# Patient Record
Sex: MALE | Race: OTHER | NOT HISPANIC OR LATINO | URBAN - METROPOLITAN AREA
[De-identification: names, ages, dates, MRNs, and addresses within clinical notes are randomized per-mention and may not be internally consistent; named-entity substitution may affect disease eponyms.]

---

## 2017-12-14 ENCOUNTER — EMERGENCY (EMERGENCY)
Facility: HOSPITAL | Age: 77
LOS: 1 days | Discharge: HOME | End: 2017-12-14
Admitting: EMERGENCY MEDICINE
Payer: MEDICARE

## 2017-12-14 VITALS
HEIGHT: 69 IN | SYSTOLIC BLOOD PRESSURE: 184 MMHG | HEART RATE: 93 BPM | TEMPERATURE: 98 F | DIASTOLIC BLOOD PRESSURE: 98 MMHG | OXYGEN SATURATION: 98 % | RESPIRATION RATE: 18 BRPM | WEIGHT: 164.91 LBS

## 2017-12-14 DIAGNOSIS — M79.89 OTHER SPECIFIED SOFT TISSUE DISORDERS: ICD-10-CM

## 2017-12-14 DIAGNOSIS — I82.401 ACUTE EMBOLISM AND THROMBOSIS OF UNSPECIFIED DEEP VEINS OF RIGHT LOWER EXTREMITY: ICD-10-CM

## 2017-12-14 DIAGNOSIS — I10 ESSENTIAL (PRIMARY) HYPERTENSION: ICD-10-CM

## 2017-12-14 DIAGNOSIS — I82.402 ACUTE EMBOLISM AND THROMBOSIS OF UNSPECIFIED DEEP VEINS OF LEFT LOWER EXTREMITY: ICD-10-CM

## 2017-12-14 DIAGNOSIS — Z98.890 OTHER SPECIFIED POSTPROCEDURAL STATES: Chronic | ICD-10-CM

## 2017-12-14 DIAGNOSIS — Z87.891 PERSONAL HISTORY OF NICOTINE DEPENDENCE: ICD-10-CM

## 2017-12-14 DIAGNOSIS — J45.20 MILD INTERMITTENT ASTHMA, UNCOMPLICATED: ICD-10-CM

## 2017-12-14 LAB
ALBUMIN SERPL ELPH-MCNC: 3.8 G/DL — SIGNIFICANT CHANGE UP (ref 3.3–5)
ALP SERPL-CCNC: 55 U/L — SIGNIFICANT CHANGE UP (ref 40–120)
ALT FLD-CCNC: 15 U/L — SIGNIFICANT CHANGE UP (ref 10–45)
ANION GAP SERPL CALC-SCNC: 10 MMOL/L — SIGNIFICANT CHANGE UP (ref 5–17)
APTT BLD: 33.8 SEC — SIGNIFICANT CHANGE UP (ref 27.5–37.4)
AST SERPL-CCNC: 23 U/L — SIGNIFICANT CHANGE UP (ref 10–40)
BASOPHILS NFR BLD AUTO: 0.2 % — SIGNIFICANT CHANGE UP (ref 0–2)
BILIRUB SERPL-MCNC: 0.5 MG/DL — SIGNIFICANT CHANGE UP (ref 0.2–1.2)
BUN SERPL-MCNC: 21 MG/DL — SIGNIFICANT CHANGE UP (ref 7–23)
CALCIUM SERPL-MCNC: 9.3 MG/DL — SIGNIFICANT CHANGE UP (ref 8.4–10.5)
CHLORIDE SERPL-SCNC: 103 MMOL/L — SIGNIFICANT CHANGE UP (ref 96–108)
CO2 SERPL-SCNC: 25 MMOL/L — SIGNIFICANT CHANGE UP (ref 22–31)
CREAT SERPL-MCNC: 1 MG/DL — SIGNIFICANT CHANGE UP (ref 0.5–1.3)
EOSINOPHIL NFR BLD AUTO: 2 % — SIGNIFICANT CHANGE UP (ref 0–6)
GLUCOSE SERPL-MCNC: 104 MG/DL — HIGH (ref 70–99)
HCT VFR BLD CALC: 40.1 % — SIGNIFICANT CHANGE UP (ref 39–50)
HGB BLD-MCNC: 14.1 G/DL — SIGNIFICANT CHANGE UP (ref 13–17)
INR BLD: 1.04 — SIGNIFICANT CHANGE UP (ref 0.88–1.16)
LYMPHOCYTES # BLD AUTO: 22.9 % — SIGNIFICANT CHANGE UP (ref 13–44)
MCHC RBC-ENTMCNC: 29.9 PG — SIGNIFICANT CHANGE UP (ref 27–34)
MCHC RBC-ENTMCNC: 35.2 G/DL — SIGNIFICANT CHANGE UP (ref 32–36)
MCV RBC AUTO: 85 FL — SIGNIFICANT CHANGE UP (ref 80–100)
MONOCYTES NFR BLD AUTO: 9.5 % — SIGNIFICANT CHANGE UP (ref 2–14)
NEUTROPHILS NFR BLD AUTO: 65.4 % — SIGNIFICANT CHANGE UP (ref 43–77)
PLATELET # BLD AUTO: 127 K/UL — LOW (ref 150–400)
POTASSIUM SERPL-MCNC: 4.6 MMOL/L — SIGNIFICANT CHANGE UP (ref 3.5–5.3)
POTASSIUM SERPL-SCNC: 4.6 MMOL/L — SIGNIFICANT CHANGE UP (ref 3.5–5.3)
PROT SERPL-MCNC: 6.5 G/DL — SIGNIFICANT CHANGE UP (ref 6–8.3)
PROTHROM AB SERPL-ACNC: 11.6 SEC — SIGNIFICANT CHANGE UP (ref 9.8–12.7)
RBC # BLD: 4.72 M/UL — SIGNIFICANT CHANGE UP (ref 4.2–5.8)
RBC # FLD: 12.5 % — SIGNIFICANT CHANGE UP (ref 10.3–16.9)
SODIUM SERPL-SCNC: 138 MMOL/L — SIGNIFICANT CHANGE UP (ref 135–145)
WBC # BLD: 6 K/UL — SIGNIFICANT CHANGE UP (ref 3.8–10.5)
WBC # FLD AUTO: 6 K/UL — SIGNIFICANT CHANGE UP (ref 3.8–10.5)

## 2017-12-14 PROCEDURE — 99284 EMERGENCY DEPT VISIT MOD MDM: CPT

## 2017-12-14 PROCEDURE — 85610 PROTHROMBIN TIME: CPT

## 2017-12-14 PROCEDURE — 36415 COLL VENOUS BLD VENIPUNCTURE: CPT

## 2017-12-14 PROCEDURE — 85025 COMPLETE CBC W/AUTO DIFF WBC: CPT

## 2017-12-14 PROCEDURE — 85730 THROMBOPLASTIN TIME PARTIAL: CPT

## 2017-12-14 PROCEDURE — 99283 EMERGENCY DEPT VISIT LOW MDM: CPT

## 2017-12-14 PROCEDURE — 80053 COMPREHEN METABOLIC PANEL: CPT

## 2017-12-14 RX ORDER — FONDAPARINUX SODIUM 2.5 MG/.5ML
1 INJECTION, SOLUTION SUBCUTANEOUS
Qty: 42 | Refills: 0 | OUTPATIENT
Start: 2017-12-14 | End: 2018-01-03

## 2017-12-14 NOTE — ED PROVIDER NOTE - MEDICAL DECISION MAKING DETAILS
Reported DVT on outside study.  Will try to obtain results to determine location and clot burden.  No s/s to suggest PE.  No hx of coagulopathy or falls that would be contraindication to AC.

## 2017-12-14 NOTE — ED PROVIDER NOTE - PHYSICAL EXAMINATION
CONSTITUTIONAL: WD,WN. NAD.    SKIN: Normal color and turgor. No rash.    HEAD: NC/AT.  EYES: Conjunctiva clear. EOMI. PERRL.    ENT: Airway patent, OP without erythema, tonsillar swelling or exudate; uvula midline without swelling. Nasal mucosa clear, no rhinorrhea.   RESPIRATORY:  Breathing non-labored. No retractions or accessory muscle use.  Lungs CTA bilat.  CARDIOVASCULAR:  RRR, S1S2. No M/R/G.      GI:  Abdomen soft, nontender.    MSK: Neck supple with painless ROM.  Bilateral LE edema below knees, left > right.  No calf tenderness or Mera sign.  Strong DP pulses.   NEURO: Alert and oriented; 5/5 strength in all extremities. Normal speech and gait.

## 2017-12-14 NOTE — ED ADULT NURSE NOTE - CHIEF COMPLAINT QUOTE
Pt directed to ED with confirmed DVT and CO LLE swelling.  Pt states "I came back from a trip on 11/13 so I had an US today and they saw a clot."  PT denies recent falls, trauma, Dizziness, N/V/D, SOB, Fevers, CP

## 2017-12-14 NOTE — ED ADULT NURSE NOTE - OBJECTIVE STATEMENT
76 y/o male c/o left leg swelling and pain. Pt states " I was sent here by my PCP because I have a blood clot in my leg, they did an US today and told me to come" . Pt denies any fall or recent injuries, but report traveling on 11/13.

## 2017-12-14 NOTE — ED ADULT TRIAGE NOTE - CHIEF COMPLAINT QUOTE
Pt CO LLE swelling.  Pt states "I came back from a trip on 11/13 and I'm concerned I have a blood clot."  PT denies recent falls, trauma, Dizziness, N/V/D, SOB, Fevers, CP Pt directed to ED with confirmed DVT and CO LLE swelling.  Pt states "I came back from a trip on 11/13 so I had an US today and they saw a clot."  PT denies recent falls, trauma, Dizziness, N/V/D, SOB, Fevers, CP

## 2017-12-14 NOTE — ED PROVIDER NOTE - OBJECTIVE STATEMENT
pt with Hx of HTN on Benicar, hx mild intermittent asthma - saw podiatrist today for his Left foot plantar fasciitis; left leg swelling noted and pt was sent for US which reportedly showed a DVT.  pt was then sent to ER for further evaluation and treatment.  He recently flew to [place] about a month ago, seated on plane apx 9 hrs.    denies any cp/palpitations/sob/hook/hemoptysis.  no prior hx of dvt/pe.  no gait or balance problems, no hx of falls.  no bleeding disorders, or unusual bruising or bleeding.

## 2017-12-14 NOTE — ED PROVIDER NOTE - PROGRESS NOTE DETAILS
called  Radiology, they will fax US report to us. called pt's PCP Dr Rodrick Snyder, got answering service.  Left message, await call back. US report: + DVT below knee in posterior tibial v. likely subacute vs chronic.  no DVT above knee.  4.1 cm popliteal cyst. 2nd call to Dr Snyder's answering svc. still no call back from PCP.  pt intelligent and reliable.  will start xarelto and have him contact PCP in morning. POST-DISCHARGE NOTE:  Patient's PCP Dr Manson called back, case discussed.  He will follow up with patient tomorrow.

## 2020-04-03 NOTE — ED PROVIDER NOTE - TOBACCO USE
Former smoker
Implemented All Fall with Harm Risk Interventions:  [place] to call system. Call bell, personal items and telephone within reach. Instruct patient to call for assistance. Room bathroom lighting operational. Non-slip footwear when patient is off stretcher. Physically safe environment: no spills, clutter or unnecessary equipment. Stretcher in lowest position, wheels locked, appropriate side rails in place. Provide visual cue, wrist band, yellow gown, etc. Monitor gait and stability. Monitor for mental status changes and reorient to person, place, and time. Review medications for side effects contributing to fall risk. Reinforce activity limits and safety measures with patient and family. Provide visual clues: red socks.

## 2021-04-09 NOTE — ED ADULT NURSE NOTE - PSH
Detail Level: Zone Topical Clindamycin Pregnancy And Lactation Text: This medication is Pregnancy Category B and is considered safe during pregnancy. It is unknown if it is excreted in breast milk. Topical Retinoid counseling:  Patient advised to apply a pea-sized amount only at bedtime and wait 30 minutes after washing their face before applying.  If too drying, patient may add a non-comedogenic moisturizer. The patient verbalized understanding of the proper use and possible adverse effects of retinoids.  All of the patient's questions and concerns were addressed. Doxycycline Counseling:  Patient counseled regarding possible photosensitivity and increased risk for sunburn.  Patient instructed to avoid sunlight, if possible.  When exposed to sunlight, patients should wear protective clothing, sunglasses, and sunscreen.  The patient was instructed to call the office immediately if the following severe adverse effects occur:  hearing changes, easy bruising/bleeding, severe headache, or vision changes.  The patient verbalized understanding of the proper use and possible adverse effects of doxycycline.  All of the patient's questions and concerns were addressed. Minocycline Pregnancy And Lactation Text: This medication is Pregnancy Category D and not consider safe during pregnancy. It is also excreted in breast milk. Isotretinoin Counseling: Patient should get monthly blood tests, not donate blood, not drive at night if vision affected, not share medication, and not undergo elective surgery for 6 months after tx completed. Side effects reviewed, pt to contact office should one occur. Spironolactone Pregnancy And Lactation Text: This medication can cause feminization of the male fetus and should be avoided during pregnancy. The active metabolite is also found in breast milk. Bactrim Pregnancy And Lactation Text: This medication is Pregnancy Category D and is known to cause fetal risk.  It is also excreted in breast milk. Topical Clindamycin Counseling: Patient counseled that this medication may cause skin irritation or allergic reactions.  In the event of skin irritation, the patient was advised to reduce the amount of the drug applied or use it less frequently.   The patient verbalized understanding of the proper use and possible adverse effects of clindamycin.  All of the patient's questions and concerns were addressed. Benzoyl Peroxide Pregnancy And Lactation Text: This medication is Pregnancy Category C. It is unknown if benzoyl peroxide is excreted in breast milk. Dapsone Pregnancy And Lactation Text: This medication is Pregnancy Category C and is not considered safe during pregnancy or breast feeding. Include Pregnancy/Lactation Warning?: No Minocycline Counseling: Patient advised regarding possible photosensitivity and discoloration of the teeth, skin, lips, tongue and gums.  Patient instructed to avoid sunlight, if possible.  When exposed to sunlight, patients should wear protective clothing, sunglasses, and sunscreen.  The patient was instructed to call the office immediately if the following severe adverse effects occur:  hearing changes, easy bruising/bleeding, severe headache, or vision changes.  The patient verbalized understanding of the proper use and possible adverse effects of minocycline.  All of the patient's questions and concerns were addressed. Erythromycin Pregnancy And Lactation Text: This medication is Pregnancy Category B and is considered safe during pregnancy. It is also excreted in breast milk. Spironolactone Counseling: Patient advised regarding risks of diarrhea, abdominal pain, hyperkalemia, birth defects (for female patients), liver toxicity and renal toxicity. The patient may need blood work to monitor liver and kidney function and potassium levels while on therapy. The patient verbalized understanding of the proper use and possible adverse effects of spironolactone.  All of the patient's questions and concerns were addressed. Bactrim Counseling:  I discussed with the patient the risks of sulfa antibiotics including but not limited to GI upset, allergic reaction, drug rash, diarrhea, dizziness, photosensitivity, and yeast infections.  Rarely, more serious reactions can occur including but not limited to aplastic anemia, agranulocytosis, methemoglobinemia, blood dyscrasias, liver or kidney failure, lung infiltrates or desquamative/blistering drug rashes. Tazorac Pregnancy And Lactation Text: This medication is not safe during pregnancy. It is unknown if this medication is excreted in breast milk. Dapsone Counseling: I discussed with the patient the risks of dapsone including but not limited to hemolytic anemia, agranulocytosis, rashes, methemoglobinemia, kidney failure, peripheral neuropathy, headaches, GI upset, and liver toxicity.  Patients who start dapsone require monitoring including baseline LFTs and weekly CBCs for the first month, then every month thereafter.  The patient verbalized understanding of the proper use and possible adverse effects of dapsone.  All of the patient's questions and concerns were addressed. Topical Sulfur Applications Pregnancy And Lactation Text: This medication is Pregnancy Category C and has an unknown safety profile during pregnancy. It is unknown if this topical medication is excreted in breast milk. High Dose Vitamin A Pregnancy And Lactation Text: High dose vitamin A therapy is contraindicated during pregnancy and breast feeding. Benzoyl Peroxide Counseling: Patient counseled that medicine may cause skin irritation and bleach clothing.  In the event of skin irritation, the patient was advised to reduce the amount of the drug applied or use it less frequently.   The patient verbalized understanding of the proper use and possible adverse effects of benzoyl peroxide.  All of the patient's questions and concerns were addressed. Azithromycin Pregnancy And Lactation Text: This medication is considered safe during pregnancy and is also secreted in breast milk. Tazorac Counseling:  Patient advised that medication is irritating and drying.  Patient may need to apply sparingly and wash off after an hour before eventually leaving it on overnight.  The patient verbalized understanding of the proper use and possible adverse effects of tazorac.  All of the patient's questions and concerns were addressed. Erythromycin Counseling:  I discussed with the patient the risks of erythromycin including but not limited to GI upset, allergic reaction, drug rash, diarrhea, increase in liver enzymes, and yeast infections. Birth Control Pills Pregnancy And Lactation Text: This medication should be avoided if pregnant and for the first 30 days post-partum. Topical Sulfur Applications Counseling: Topical Sulfur Counseling: Patient counseled that this medication may cause skin irritation or allergic reactions.  In the event of skin irritation, the patient was advised to reduce the amount of the drug applied or use it less frequently.   The patient verbalized understanding of the proper use and possible adverse effects of topical sulfur application.  All of the patient's questions and concerns were addressed. High Dose Vitamin A Counseling: Side effects reviewed, pt to contact office should one occur. Sarecycline Counseling: Patient advised regarding possible photosensitivity and discoloration of the teeth, skin, lips, tongue and gums.  Patient instructed to avoid sunlight, if possible.  When exposed to sunlight, patients should wear protective clothing, sunglasses, and sunscreen.  The patient was instructed to call the office immediately if the following severe adverse effects occur:  hearing changes, easy bruising/bleeding, severe headache, or vision changes.  The patient verbalized understanding of the proper use and possible adverse effects of sarecycline.  All of the patient's questions and concerns were addressed. Azithromycin Counseling:  I discussed with the patient the risks of azithromycin including but not limited to GI upset, allergic reaction, drug rash, diarrhea, and yeast infections. Topical Retinoid Pregnancy And Lactation Text: This medication is Pregnancy Category C. It is unknown if this medication is excreted in breast milk. Doxycycline Pregnancy And Lactation Text: This medication is Pregnancy Category D and not consider safe during pregnancy. It is also excreted in breast milk but is considered safe for shorter treatment courses. Birth Control Pills Counseling: Birth Control Pill Counseling: I discussed with the patient the potential side effects of OCPs including but not limited to increased risk of stroke, heart attack, thrombophlebitis, deep venous thrombosis, hepatic adenomas, breast changes, GI upset, headaches, and depression.  The patient verbalized understanding of the proper use and possible adverse effects of OCPs. All of the patient's questions and concerns were addressed. Isotretinoin Pregnancy And Lactation Text: This medication is Pregnancy Category X and is considered extremely dangerous during pregnancy. It is unknown if it is excreted in breast milk. Tetracycline Counseling: Patient counseled regarding possible photosensitivity and increased risk for sunburn.  Patient instructed to avoid sunlight, if possible.  When exposed to sunlight, patients should wear protective clothing, sunglasses, and sunscreen.  The patient was instructed to call the office immediately if the following severe adverse effects occur:  hearing changes, easy bruising/bleeding, severe headache, or vision changes.  The patient verbalized understanding of the proper use and possible adverse effects of tetracycline.  All of the patient's questions and concerns were addressed. Patient understands to avoid pregnancy while on therapy due to potential birth defects. H/O knee surgery

## 2024-01-21 ENCOUNTER — EMERGENCY (EMERGENCY)
Facility: HOSPITAL | Age: 84
LOS: 1 days | Discharge: HOME | End: 2024-01-21
Attending: EMERGENCY MEDICINE | Admitting: EMERGENCY MEDICINE
Payer: MEDICARE

## 2024-01-21 VITALS
HEART RATE: 58 BPM | SYSTOLIC BLOOD PRESSURE: 119 MMHG | TEMPERATURE: 98 F | WEIGHT: 156.97 LBS | OXYGEN SATURATION: 97 % | RESPIRATION RATE: 18 BRPM | DIASTOLIC BLOOD PRESSURE: 58 MMHG

## 2024-01-21 VITALS
OXYGEN SATURATION: 97 % | HEART RATE: 70 BPM | DIASTOLIC BLOOD PRESSURE: 72 MMHG | RESPIRATION RATE: 18 BRPM | SYSTOLIC BLOOD PRESSURE: 161 MMHG | TEMPERATURE: 98 F
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
## 2024-01-21 DIAGNOSIS — S60.031A CONTUSION OF RIGHT MIDDLE FINGER WITHOUT DAMAGE TO NAIL, INITIAL ENCOUNTER: ICD-10-CM

## 2024-01-21 DIAGNOSIS — Z86.718 PERSONAL HISTORY OF OTHER VENOUS THROMBOSIS AND EMBOLISM: ICD-10-CM
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
## 2024-01-21 DIAGNOSIS — E78.5 HYPERLIPIDEMIA, UNSPECIFIED: ICD-10-CM

## 2024-01-21 DIAGNOSIS — I10 ESSENTIAL (PRIMARY) HYPERTENSION: ICD-10-CM

## 2024-01-21 DIAGNOSIS — Z88.2 ALLERGY STATUS TO SULFONAMIDES: ICD-10-CM

## 2024-01-21 DIAGNOSIS — Z79.01 LONG TERM (CURRENT) USE OF ANTICOAGULANTS: ICD-10-CM

## 2024-01-21 DIAGNOSIS — Z98.890 OTHER SPECIFIED POSTPROCEDURAL STATES: Chronic | ICD-10-CM

## 2024-01-21 DIAGNOSIS — S60.211A CONTUSION OF RIGHT WRIST, INITIAL ENCOUNTER: ICD-10-CM

## 2024-01-21 DIAGNOSIS — S09.90XA UNSPECIFIED INJURY OF HEAD, INITIAL ENCOUNTER: ICD-10-CM

## 2024-01-21 DIAGNOSIS — M25.531 PAIN IN RIGHT WRIST: ICD-10-CM

## 2024-01-21 PROCEDURE — 73110 X-RAY EXAM OF WRIST: CPT | Mod: 26,RT

## 2024-01-21 PROCEDURE — 73140 X-RAY EXAM OF FINGER(S): CPT

## 2024-01-21 PROCEDURE — 73090 X-RAY EXAM OF FOREARM: CPT | Mod: 26,RT

## 2024-01-21 PROCEDURE — 73140 X-RAY EXAM OF FINGER(S): CPT | Mod: 26,RT

## 2024-01-21 PROCEDURE — 70450 CT HEAD/BRAIN W/O DYE: CPT | Mod: 26,MA

## 2024-01-21 PROCEDURE — 73110 X-RAY EXAM OF WRIST: CPT

## 2024-01-21 PROCEDURE — 70450 CT HEAD/BRAIN W/O DYE: CPT | Mod: MA

## 2024-01-21 PROCEDURE — 99284 EMERGENCY DEPT VISIT MOD MDM: CPT | Mod: 25

## 2024-01-21 PROCEDURE — 73090 X-RAY EXAM OF FOREARM: CPT

## 2024-01-21 PROCEDURE — 99285 EMERGENCY DEPT VISIT HI MDM: CPT

## 2024-01-21 RX ORDER — ACETAMINOPHEN 500 MG
650 TABLET ORAL ONCE
Refills: 0 | Status: COMPLETED | OUTPATIENT
Start: 2024-01-21 | End: 2024-01-21

## 2024-01-21 RX ADMIN — Medication 650 MILLIGRAM(S): at 15:19

## 2024-01-21 NOTE — ED ADULT NURSE NOTE - OBJECTIVE STATEMENT
83yM pmhx afib, HTN presents to ER complaining of a mechanical trip and fall at 3pm yesterday with (+) headstrike (+) AC use (-) LOC. Pt states he tripped on an uneven surface, denies dizziness prior to or after fall. Complaining of right hand pain and swelling. Additionally lacerations on nose. Denies changes in vision, dizziness, lightheadedness, SOB/Cp, changes in sensation N/V/D.

## 2024-01-21 NOTE — ED PROVIDER NOTE - NSFOLLOWUPINSTRUCTIONS_ED_ALL_ED_FT
Can take tylenol 650mg every 6hrs as needed for pain.    Stay well hydrated.    Can use intermittent ice packs (few minutes on and few minutes off) for first 24-48 hours.    Follow up with primary doctor within 1-2 days.     Follow up with orthopedist for persistent joint pain. Can call [phone] to schedule appointment.     Return to ER for persistent fever/vomit, uncontrolled pain, worsening swelling, worsening weakness/numbness, vision changes, worsening breathing, worsening lightheaded.    Head Injury, Adult    There are many types of head injuries. Head injuries can be as minor as a bump, or they can be more severe. More severe head injuries include:  A jarring injury to the brain (concussion).  A bruise of the brain (contusion). This means there is bleeding in the brain that can cause swelling.  A cracked skull (skull fracture).  Bleeding in the brain that collects, clots, and forms a bump (hematoma).    After a head injury, you may need to be observed for a while in the emergency department or urgent care. Sometimes admission to the hospital is needed.    After a head injury has happened, most problems occur within the first 24 hours, but side effects may occur up to 7–10 days after the injury. It is important to watch your condition for any changes.    What are the causes?  There are many possible causes of a head injury. A serious head injury may happen to someone who is in a car accident (motor vehicle collision). Other causes of major head injuries include bicycle or motorcycle accidents, sports injuries, and falls.    Risk factors  This condition is more likely to occur in people who:  Drink a lot of alcohol or use drugs.  Are over the age of 65.  Are at risk for falls.    What are the symptoms?  There are many possible symptoms of a head injury. Visible symptoms of a head injury include a bruise, bump, or bleeding at the site of the injury. Other non-visible symptoms include:  Feeling sleepy or not being able to stay awake.  Passing out.  Headache.  Seizures.  Dizziness.  Confusion.  Memory problems.  Nausea or vomiting.  Other possible symptoms that may develop after the head injury include:  Poor attention and concentration.  Fatigue or tiring easily.  Irritability.  Being uncomfortable around bright lights or loud noises.  Anxiety or depression.  Disturbed sleep.    How is this diagnosed?  This condition can usually be diagnosed based on your symptoms, a description of the injury, and a physical exam. You may also have imaging tests done, such as a CT scan or MRI. You will also be closely watched.    How is this treated?  Treatment for this condition depends on the severity and type of injury you have. The main goal of treatment is to prevent complications and allow the brain time to heal.    For mild head injury, you may be sent home and treatment may include:  Observation. A responsible adult should stay with you for 24 hours after your injury and check on you often.  Physical rest.  Brain rest.  Pain medicines.  For severe brain injury, treatment may include:  Close observation. This includes hospitalization with frequent physical exams. You may need to go to a hospital that specializes in head injury.  Pain medicines.  Breathing support. This may include using a ventilator.  Managing the pressure inside the brain (intracranial pressure, or ICP). This may include:  Monitoring the ICP.  Giving medicines to decrease the ICP.  Positioning you to decrease the ICP.  Medicine to prevent seizures.  Surgery to stop bleeding or to remove blood clots (craniotomy).  Surgery to remove part of the skull (decompressive craniectomy). This allows room for the brain to swell.    Follow these instructions at home:  Activity   Rest as much as possible and avoid activities that are physically hard or tiring.  Make sure you get enough sleep.  Limit activities that require a lot of thought or attention, such as:  Watching TV.  Playing memory games and puzzles.  Job-related work or homework.  Working on the computer, social media, and texting.  Avoid activities that could cause another head injury, such as playing sports, until your health care provider approves. Having another head injury, especially before the first one has healed, can be dangerous.    Ask your health care provider when it is safe for you to return to your regular activities, including work or school. Ask your health care provider for a step-by-step plan for gradually returning to activities.  Ask your health care provider when you can drive, ride a bicycle, or use heavy machinery. Your ability to react may be slower after a brain injury. Never do these activities if you are dizzy.    Lifestyle     Do not drink alcohol until your health care provider approves, and avoid drug use. Alcohol and certain drugs may slow your recovery and can put you at risk of further injury.  If it is harder than usual to remember things, write them down.  If you are easily distracted, try to do one thing at a time.  Talk with family members or close friends when making important decisions.  Tell your friends, family, a trusted colleague, and  about your injury, symptoms, and restrictions. Have them watch for any new or worsening problems.  General instructions     Take over-the-counter and prescription medicines only as told by your health care provider.  Have someone stay with you for 24 hours after your head injury. This person should watch you for any changes in your symptoms and be ready to seek medical help, as needed.  Keep all follow-up visits as told by your health care provider. This is important.    How is this prevented?  Work on improving your balance and strength to avoid falls.  Wear a seatbelt when you are in a moving vehicle.  Wear a helmet when riding a bicycle, skiing, or doing any other sport or activity that has a risk of injury.  Drink alcohol only in moderation.  Take safety measures in your home, such as:  Removing clutter and tripping hazards from floors and stairways.  Using grab bars in bathrooms and handrails by stairs.  Placing non-slip mats on floors and in bathtubs.  Improving lighting in dim areas.    Get help right away if:  You have:  A severe headache that is not helped by medicine.  Trouble walking, have weakness in your arms and legs, or lose your balance.  Clear or bloody fluid coming from your nose or ears.  Changes in your vision.  A seizure.  You vomit.  Your symptoms get worse.  Your speech is slurred.  You pass out.  You are sleepier and have trouble staying awake.  Your pupils change size.  These symptoms may represent a serious problem that is an emergency. Do not wait to see if the symptoms will go away. Get medical help right away. Call your local emergency services (911 in the [place] Do not drive yourself to the hospital.     Post-Concussion Syndrome    A concussion is a brain injury from a direct hit (blow) to your head or body. This blow causes your brain to shake quickly back and forth inside your skull. This can damage brain cells and cause chemical changes in your brain. Concussions are usually not life-threatening but can cause several serious symptoms.    Post-concussion syndrome is when symptoms that occur after a concussion last longer than normal. These symptoms can last from weeks to months.    What are the causes?  The cause of this condition is not known. It can happen whether your head injury was mild or severe.    What increases the risk?  You are more likely to develop this condition if:  You are female.  You are a child, teen, or young adult.  You had a past head injury.  You have a history of headaches.  You have depression or anxiety.    What are the signs or symptoms?    Physical symptoms   Headaches.  Tiredness.  Dizziness.  Weakness.  Blurry vision.  Sensitivity to light.  Hearing difficulties.  Mental and emotional symptoms     Memory difficulties.  Difficulty with concentration.  Difficulty sleeping or staying asleep.  Feeling irritable.  Anxiety or depression.  Difficulty learning new things.    How is this diagnosed?  This condition may be diagnosed based on:  Your symptoms.  A description of your injury.  Your medical history.  Your health care provider may order other tests such as:  Brain function tests (neurological testing).  CT scan.    How is this treated?  Treatment for this condition may depend on your symptoms. Symptoms usually go away on their own over time. Treatments may include:  Medicines for headaches.  Resting your brain and body for a few days after your injury.  Rehabilitation therapy, such as:  Physical or occupational therapy. This may include exercises to help with balance and dizziness.  Mental health counseling.  Speech therapy.  Vision therapy. A brain and eye specialist can recommend treatments for vision problems.    Follow these instructions at home:  Medicines     Take over-the-counter and prescription medicines only as told by your health care provider.  Avoid opioid prescription pain medicines when recovering from a concussion.  Activity     Limit your mental activities for the first few days after your injury, such as:  Homework or job-related work.  Complex thinking.  Watching TV, and using a computer or phone.  Playing memory games and puzzles.  Gradually return to your normal activity level. If a certain activity brings on your symptoms, stop or slow down until you can do the activity without it triggering your symptoms.  Limit physical activity, such as exercise or sports, for the first few days after a concussion. Gradually return to normal activity as told by your health care provider.  If a certain activity brings on your symptoms, stop or slow down until you can do the activity without it triggering your symptoms.  Rest. Rest helps your brain heal. Make sure you:  Get plenty of sleep at night. Most adults should get at least 7–9 hours of sleep each night.  Rest during the day. Take naps or rest breaks when you feel tired.  Do not do high-risk activities that could cause a second concussion, such as riding a bike or playing sports. Having another concussion before the first one has healed can be dangerous.    General instructions   Do not drink alcohol until your health care provider says you can.  Keep track of the frequency and the severity of your symptoms. Give this information to your health care provider.  Keep all follow-up visits as directed by your health care provider. This is important.  Contact a health care provider if:  Your symptoms do not improve.  You have another injury.  Get help right away if you:  Have a severe or worsening headache.  Are confused.  Have trouble staying awake.  Pass out.  Vomit.  Have weakness or numbness in any part of your body.  Have a seizure.  Have trouble speaking.  Summary  Post-concussion syndrome is when symptoms that occur after a concussion last longer than normal.  Symptoms usually go away on their own over time. Depending on your symptoms, you may need treatment, such as medicines or rehabilitation therapy.  Rest your brain and body for a few days after your injury. Gradually return to activities, as told by your health care provider.  Get plenty of sleep, and avoid alcohol and opioid pain medicines while recovering from a concussion.     Sprain    A sprain is a stretch or tear in one of the tough, fiber-like tissues (ligaments) in your body. This is caused by an injury to the area such as a twisting mechanism. Symptoms include pain, swelling, or bruising. Rest that area over the next several days and slowly resume activity when tolerated. Ice can help with swelling and pain.     SEEK IMMEDIATE MEDICAL CARE IF YOU HAVE ANY OF THE FOLLOWING SYMPTOMS: worsening pain, inability to move that body part, numbness or tingling.

## 2024-01-21 NOTE — ED PROVIDER NOTE - OBJECTIVE STATEMENT
83M PMH DVT on eliquis, HTN, HLD, melanoma (s/p recent Mohs) p/w pain/fall. Pt states that yesterday he tripped on uneven sidewalk and fell forward. Hit face on ground and braced himself w/ his hands. Felt mild R wrist pain yesterday but worsened today - went to  which pt states referred to ED bec of his head trauma. No other systemic symptoms.   R handed.   Denies any preceding symptoms prior to fall. Denies LOC, vision changes, focal weakness, focal numbness, neck pain, nose/tooth/jaw pain, back pain, SOB, CP, HA, abd pain, nausea, vomiting, diarrhea, black stool, bloody stool, urinary complaints, URI symptoms. Denies other MSK pain. Denies recent illnesses or medication changes.

## 2024-01-21 NOTE — ED PROVIDER NOTE - CHPI ED SYMPTOMS POS
Dr Bradford ...  Thomas will be in your office on 3/27 for follow up ... He is progressing well but still lacks a minimal amount of terminal extension ROM (ext -5 and  flexion 122) and strength. He has been cooperative and compliant with PT.  Please let me know if you have questions with regard to his care.  Thank You for this referral,  Saúl Patricias PT 
[place]

## 2024-01-21 NOTE — ED ADULT TRIAGE NOTE - CHIEF COMPLAINT QUOTE
" I tripped and fell yesterday and hit my face and nose" " I tripped and fell yesterday and hit my face and nose and my right hand also hurts"

## 2024-01-21 NOTE — ED PROVIDER NOTE - PROGRESS NOTE DETAILS
[place]: CTH w/ no acute pathology. XR wnl. Placed ace wrap to R wrist for comfort.   Discussed importance of outpt follow up and return precautions. Clinically no indication for further emergent ED workup or hospitalization at this time. Stable for dc, outpt f/u.

## 2024-01-21 NOTE — ED PROVIDER NOTE - CLINICAL SUMMARY MEDICAL DECISION MAKING FREE TEXT BOX
83M PMH DVT on eliquis, HTN, HLD, melanoma (s/p recent Mohs) p/w pain/fall. Pt states that yesterday he tripped on uneven sidewalk and fell forward. Hit face on ground and braced himself w/ his hands. Felt mild R wrist pain yesterday but worsened today - went to  which pt states referred to ED bec of his head trauma. No other systemic symptoms.   Vitals wnl, exam as above.  ddx: Mechanical fall. R wrist/hand trauma, low suspicion but possible fx. also mild facial trauma. Very low suspicion for ICH. Clinically no fx.   Given age/eliquis use, will CTH, XR, tylenol, reassess.

## 2024-01-21 NOTE — ED ADULT NURSE NOTE - NSFALLHARMRISKINTERV_ED_ALL_ED

## 2024-01-21 NOTE — ED PROVIDER NOTE - PATIENT PORTAL LINK FT
You can access the FollowMyHealth Patient Portal offered by [facility] by registering at the following website: [email or web address]. By joining [employer]’s FollowMyHealth portal, you will also be able to view your health information using other applications (apps) compatible with our system.

## 2024-01-21 NOTE — ED PROVIDER NOTE - PHYSICAL EXAMINATION
minimal healing superficial abrasion to anterior nose  No trismus. Jaw FROM. No obvious facial swelling. Normal voice. No stridor/drooling. No bony ttp. No bleeding. No proptosis. Ears symmetrical. No mastoid ttp. Neck FROM. No nuchal rigidity. No pain w/ ear manipulation. No tooth ttp.  Mild R wrsit echymosis and swelling, mild R wrist decreased ROM. mild swelling/ecchymosis to PIP joint of 3rd digit. Mild decreased ROM to that PIP joint as well.  No spinal ttp, neck FROM. Strength 5/5. No other bony ttp, FROM all other extremities. Normal equal distal pulses. Steady unassisted gait.  sutures in place to forehead where pt had recent Mohs, healing well.

## 2024-01-22 PROBLEM — I10 ESSENTIAL (PRIMARY) HYPERTENSION: Chronic | Status: ACTIVE | Noted: 2017-12-14

## 2024-12-26 NOTE — ED ADULT NURSE NOTE - FINAL NURSING ELECTRONIC SIGNATURE
14-Dec-2017 20:08
